# Patient Record
Sex: FEMALE | Race: AMERICAN INDIAN OR ALASKA NATIVE
[De-identification: names, ages, dates, MRNs, and addresses within clinical notes are randomized per-mention and may not be internally consistent; named-entity substitution may affect disease eponyms.]

---

## 2021-07-27 ENCOUNTER — HOSPITAL ENCOUNTER (OUTPATIENT)
Dept: HOSPITAL 5 - ED | Age: 21
Setting detail: OBSERVATION
LOS: 1 days | Discharge: HOME | End: 2021-07-28
Attending: OBSTETRICS & GYNECOLOGY | Admitting: OBSTETRICS & GYNECOLOGY
Payer: COMMERCIAL

## 2021-07-27 DIAGNOSIS — D64.9: Primary | ICD-10-CM

## 2021-07-27 DIAGNOSIS — Z98.890: ICD-10-CM

## 2021-07-27 DIAGNOSIS — I47.1: ICD-10-CM

## 2021-07-27 DIAGNOSIS — O07.4: ICD-10-CM

## 2021-07-27 DIAGNOSIS — N93.8: ICD-10-CM

## 2021-07-27 DIAGNOSIS — Z79.899: ICD-10-CM

## 2021-07-27 DIAGNOSIS — E78.00: ICD-10-CM

## 2021-07-27 DIAGNOSIS — G43.909: ICD-10-CM

## 2021-07-27 LAB
ALBUMIN SERPL-MCNC: 4.1 G/DL (ref 3.9–5)
ALT SERPL-CCNC: 22 UNITS/L (ref 7–56)
BASOPHILS # (AUTO): 0.1 K/MM3 (ref 0–0.1)
BASOPHILS NFR BLD AUTO: 1.5 % (ref 0–1.8)
BUN SERPL-MCNC: 12 MG/DL (ref 7–17)
BUN/CREAT SERPL: 24 %
CALCIUM SERPL-MCNC: 9.2 MG/DL (ref 8.4–10.2)
EOSINOPHIL # BLD AUTO: 0.1 K/MM3 (ref 0–0.4)
EOSINOPHIL NFR BLD AUTO: 1.7 % (ref 0–4.3)
HCT VFR BLD CALC: 17.6 % (ref 30.3–42.9)
HEMOLYSIS INDEX: 2
HGB BLD-MCNC: 5.7 GM/DL (ref 10.1–14.3)
LYMPHOCYTES # BLD AUTO: 1 K/MM3 (ref 1.2–5.4)
LYMPHOCYTES NFR BLD AUTO: 13.4 % (ref 13.4–35)
MCHC RBC AUTO-ENTMCNC: 32 % (ref 30–34)
MCV RBC AUTO: 80 FL (ref 79–97)
MONOCYTES # (AUTO): 0.5 K/MM3 (ref 0–0.8)
MONOCYTES % (AUTO): 7.6 % (ref 0–7.3)
PLATELET # BLD: 268 K/MM3 (ref 140–440)
RBC # BLD AUTO: 2.2 M/MM3 (ref 3.65–5.03)

## 2021-07-27 PROCEDURE — 96374 THER/PROPH/DIAG INJ IV PUSH: CPT

## 2021-07-27 PROCEDURE — 86850 RBC ANTIBODY SCREEN: CPT

## 2021-07-27 PROCEDURE — 85014 HEMATOCRIT: CPT

## 2021-07-27 PROCEDURE — 85025 COMPLETE CBC W/AUTO DIFF WBC: CPT

## 2021-07-27 PROCEDURE — 36415 COLL VENOUS BLD VENIPUNCTURE: CPT

## 2021-07-27 PROCEDURE — 86900 BLOOD TYPING SEROLOGIC ABO: CPT

## 2021-07-27 PROCEDURE — 36430 TRANSFUSION BLD/BLD COMPNT: CPT

## 2021-07-27 PROCEDURE — 86901 BLOOD TYPING SEROLOGIC RH(D): CPT

## 2021-07-27 PROCEDURE — 76856 US EXAM PELVIC COMPLETE: CPT

## 2021-07-27 PROCEDURE — 76830 TRANSVAGINAL US NON-OB: CPT

## 2021-07-27 PROCEDURE — G0378 HOSPITAL OBSERVATION PER HR: HCPCS

## 2021-07-27 PROCEDURE — 85018 HEMOGLOBIN: CPT

## 2021-07-27 PROCEDURE — P9016 RBC LEUKOCYTES REDUCED: HCPCS

## 2021-07-27 PROCEDURE — 80053 COMPREHEN METABOLIC PANEL: CPT

## 2021-07-27 PROCEDURE — 86920 COMPATIBILITY TEST SPIN: CPT

## 2021-07-27 PROCEDURE — 84702 CHORIONIC GONADOTROPIN TEST: CPT

## 2021-07-27 PROCEDURE — 99291 CRITICAL CARE FIRST HOUR: CPT

## 2021-07-27 NOTE — HISTORY AND PHYSICAL REPORT
History of Present Illness


Date of examination: 21


Date of admission: 


21 18:14





Chief complaint: 





anemia and vaginal bleeding.


History of present illness: 





Patient is a 20-year-old Afro-American female  1 para 0-0-1-0. She had a 

medically induced  several weeks ago. Her ultrasound shows tissue inside

endometrial cavity and the patient is bleeding and has anemia. She is scheduled 

for D&C on Friday this week. She was admitted for observation with transfusion 

of 2 units of packed cells today.





Past History


Past Medical History: no pertinent history


Past Surgical History: no surgical history


Family/Genetic History: none


Social history: single





Medications and Allergies


                                    Allergies











Allergy/AdvReac Type Severity Reaction Status Date / Time


 


No Known Allergies Allergy   Verified 21 15:00











                                Home Medications











 Medication  Instructions  Recorded  Confirmed  Last Taken  Type


 


Ferrous Sulfate [Feosol] 325 mg PO QDAY 21 Unknown History











Active Meds: 


Active Medications





Hydrocodone Bitart/Acetaminophen (Hydrocodone/Acetaminophen 5-325 Mg Tab)  1 

each PO Q6H PRN


   PRN Reason: Pain, Moderate (4-6)


Lactated Ringer's (Lactated Ringers)  1,000 mls @ 125 mls/hr IV AS DIRECT ERIC











Review of Systems


All systems: negative





- Vital Signs


Vital signs: 


                                   Vital Signs











Temp Pulse Resp BP Pulse Ox


 


 98.3 F   115 H  20   123/50   100 


 


 21 11:53  21 11:53  21 11:53  21 11:53  21 11:53








                                        











Temp Pulse Resp BP Pulse Ox


 


 98.8 F   97 H  20   128/73   100 


 


 21 20:40  21 20:40  21 20:40  21 20:40  21 20:40














- Physical Exam


Breasts: 


Cardiovascular: Regular rate, Normal S1, Normal S2


Abdomen: Positive: normal appearance, soft, normal bowel sounds.  Negative: 

distention, tenderness


Genitourinary (Female): Positive: normal external genitalia


Vulva: both: normal


Vagina: Positive: normal moisture.  Negative: discharge


Cervix: Positive: other (Blood in cervix cervix slightly dilated).  Negative: 

lesion, discharge


Uterus: Positive: normal size, normal contour


Adnexa: both: normal


Anus/Rectum: Positive: normal perianal skin, heme negative.  Negative: rectal 

mass, hemorrhoids


Extremities: Positive: normal


Deep Tendon Reflex Grade: Normal +2





Results


Result Diagrams: 


                                 21 12:17





                                 21 12:17


                              Abnormal lab results











  21 Range/Units





  12:17 12:17 12:17 


 


RBC  2.20 L    (3.65-5.03)  M/mm3


 


Hgb  5.7 L*    (10.1-14.3)  gm/dl


 


Hct  17.6 L*    (30.3-42.9)  %


 


MCH  26 L    (28-32)  pg


 


RDW  19.8 H    (13.2-15.2)  %


 


Mono % (Auto)  7.6 H    (0.0-7.3)  %


 


Lymph # (Auto)  1.0 L    (1.2-5.4)  K/mm3


 


Seg Neutrophils %  75.8 H    (40.0-70.0)  %


 


Carbon Dioxide   21 L   (22-30)  mmol/L


 


Creatinine   0.5 L   (0.6-1.2)  mg/dL


 


HCG, Quant    67.32 H  (0-4)  mIU/mL


 


Crossmatch     














  21 Range/Units





  12:20 


 


RBC   (3.65-5.03)  M/mm3


 


Hgb   (10.1-14.3)  gm/dl


 


Hct   (30.3-42.9)  %


 


MCH   (28-32)  pg


 


RDW   (13.2-15.2)  %


 


Mono % (Auto)   (0.0-7.3)  %


 


Lymph # (Auto)   (1.2-5.4)  K/mm3


 


Seg Neutrophils %   (40.0-70.0)  %


 


Carbon Dioxide   (22-30)  mmol/L


 


Creatinine   (0.6-1.2)  mg/dL


 


HCG, Quant   (0-4)  mIU/mL


 


Crossmatch  See Detail  








All other labs normal.








Assessment and Plan





- Patient Problems


(1) Anemia requiring transfusions


Current Visit: Yes   Status: Acute   





(2) DUB (dysfunctional uterine bleeding)


Current Visit: Yes   Status: Acute   





(3) Retained products of conception


Current Visit: Yes   Status: Acute   


Plan to address problem: 


Transfusion with 2 units of packed cells on today. The patient is to have a D&C 

suction curettage in 3 days this hospital.

## 2021-07-27 NOTE — ULTRASOUND REPORT
US pelvic complete



INDICATION / CLINICAL INFORMATION: 

vaginal bleeding.



TECHNIQUE:

Transabdominal and Transvaginal.

Duplex Color Doppler used: Yes. 



COMPARISON: 

None available



FINDINGS:

UTERUS:  Measures 11.8 cm. 

-Thickened endometrial stripe measures 1.6 cm with increased hyperemia.

- Mass lesions: None.





RIGHT ADNEXA:  No significant ovarian cyst or mass. Normal color Doppler blood flow.



LEFT ADNEXA: No significant ovarian cyst or mass. Normal color Doppler blood flow.



URINARY BLADDER: No significant abnormality. 

FREE FLUID: None.

ADDITIONAL FINDINGS: None.



IMPRESSION:

1. Findings consistent with retained products of conception.



Signer Name: Ray Juarez MD 

Signed: 7/27/2021 5:50 PM

Workstation Name: [id number]

## 2021-07-27 NOTE — EVENT NOTE
ED Screening Note


ED Screening Note: 





Patient states 2 months ago she had a medical pill 


States since then she has had heavy vaginal bleeding


She states that she went to [facility] and was advised she had a hemoglobin of 5 and 

states that she received a blood transfusion


States that she is scheduled to have a D&C by [facility] on 21


Reports to the ER due to pain, weakness, fatigue, and concerns that her blood 

counts are low


She states that her bleeding has improved








This initial assessment/diagnostic orders/clinical plan/treatment(s) is/are 

subject to change based on patients health status, clinical progression and re-

assessment by fellow clinical providers in the ED. Further treatment and workup 

at subsequent clinical providers discretion. Patient/guardian urged not to elope

from the ED as their condition may be serious if not clinically assessed and 

managed. 





Initial orders include: 


Labs

## 2021-07-27 NOTE — EMERGENCY DEPARTMENT REPORT
ED General Adult HPI





- General


Chief complaint: Vaginal Bleeding


Stated complaint: DIFF BREATHING


Time Seen by Provider: 07/27/21 11:56


Source: patient, family


Mode of arrival: Wheelchair


Limitations: No Limitations





- History of Present Illness


Initial comments: 





Patient presents to the emergency department with a chief complaint of vaginal 

bleeding that has been present for the last 2 months.  Patient states that 2 

months ago she took a pill to terminate pregnancy and since that time has not 

had cessation of vaginal bleeding.  Patient states that on 6/9/2021 she was seen

at [facility] at the main campus and was transfused 2 units of blood

and told to follow-up with gynecology.  Patient states she is scheduled for a 

D&C with [facility] obstetrics and gynecology on Friday of this week.  Patient 

complains of mild abdominal cramping.  Patient states that she is having 

exertional shortness of breath especially when walking.  Denies chest pain.


-: Sudden, unknown (2 months ago)


Location: abdomen


Radiation: non-radiation


Severity scale (0 -10): 2 (Cramping in nature)


Quality: other (Cramping in nature)


Consistency: constant


Improves with: none


Worsens with: none


Associated Symptoms: denies other symptoms


Treatments Prior to Arrival: none





- Related Data


                                Home Medications











 Medication  Instructions  Recorded  Confirmed  Last Taken


 


Ferrous Sulfate [Feosol] 325 mg PO QDAY 07/26/21 07/26/21 Unknown











                                    Allergies











Allergy/AdvReac Type Severity Reaction Status Date / Time


 


No Known Allergies Allergy   Verified 07/26/21 15:00














ED Review of Systems


ROS: 


Stated complaint: DIFF BREATHING


Other details as noted in HPI





Comment: All other systems reviewed and negative


Constitutional: denies: chills, fever


Eyes: denies: eye pain, eye discharge, vision change


ENT: denies: ear pain, throat pain


Respiratory: denies: cough, shortness of breath, wheezing


Cardiovascular: denies: chest pain, palpitations


Endocrine: no symptoms reported


Gastrointestinal: denies: abdominal pain, nausea, diarrhea


Genitourinary: other (Vaginal bleeding).  denies: urgency, dysuria, discharge


Musculoskeletal: denies: back pain, joint swelling, arthralgia


Skin: denies: rash, lesions


Neurological: denies: headache, weakness, paresthesias


Psychiatric: denies: anxiety, depression


Hematological/Lymphatic: denies: easy bleeding, easy bruising





ED Past Medical Hx





- Past Medical History


Previous Medical History?: Yes


Hx Hypertension: No


Hx Headaches / Migraines: Yes (MIGRAINES)


Additional medical history: SVT.  high cholestrol.  anemia





- Surgical History


Additional Surgical History: ablation 12/2014





- Social History


Smoking Status: Never Smoker





- Medications


Home Medications: 


                                Home Medications











 Medication  Instructions  Recorded  Confirmed  Last Taken  Type


 


Ferrous Sulfate [Feosol] 325 mg PO QDAY 07/26/21 07/26/21 Unknown History














ED Physical Exam





- General


Limitations: No Limitations


General appearance: alert, in no apparent distress





- Head


Head exam: Present: atraumatic, normocephalic





- Eye


Eye exam: Present: normal appearance, PERRL, EOMI





- ENT


ENT exam: Present: mucous membranes moist





- Neck


Neck exam: Present: normal inspection





- Respiratory


Respiratory exam: Present: normal lung sounds bilaterally.  Absent: respiratory 

distress





- Cardiovascular


Cardiovascular Exam: Present: normal rhythm, tachycardia.  Absent: systolic 

murmur, diastolic murmur, rubs, gallop





- GI/Abdominal


GI/Abdominal exam: Present: soft, normal bowel sounds.  Absent: distended, 

tenderness





- 


External exam: Present: other (Deferred)


Speculum exam: Present: other (Deferred)


Bi-manual exam: Present: other (Deferred)





- Extremities Exam


Extremities exam: Present: normal inspection





- Back Exam


Back exam: Present: normal inspection





- Neurological Exam


Neurological exam: Present: alert, oriented X3, CN II-XII intact.  Absent: motor

 sensory deficit





- Psychiatric


Psychiatric exam: Present: normal affect, normal mood, anxious





- Skin


Skin exam: Present: warm, dry, intact, normal color.  Absent: rash





ED Course


                                   Vital Signs











  07/27/21





  15:36


 


O2 Sat by Pulse 98





Oximetry 














ED Medical Decision Making





- Lab Data


Result diagrams: 


                                 07/27/21 12:17





                                 07/27/21 12:17








                                   Lab Results











  07/27/21 07/27/21 07/27/21 Range/Units





  12:17 12:17 12:17 


 


WBC  7.2    (4.5-11.0)  K/mm3


 


RBC  2.20 L    (3.65-5.03)  M/mm3


 


Hgb  5.7 L*    (10.1-14.3)  gm/dl


 


Hct  17.6 L*    (30.3-42.9)  %


 


MCV  80    (79-97)  fl


 


MCH  26 L    (28-32)  pg


 


MCHC  32    (30-34)  %


 


RDW  19.8 H    (13.2-15.2)  %


 


Plt Count  268    (140-440)  K/mm3


 


Lymph % (Auto)  13.4    (13.4-35.0)  %


 


Mono % (Auto)  7.6 H    (0.0-7.3)  %


 


Eos % (Auto)  1.7    (0.0-4.3)  %


 


Baso % (Auto)  1.5    (0.0-1.8)  %


 


Lymph # (Auto)  1.0 L    (1.2-5.4)  K/mm3


 


Mono # (Auto)  0.5    (0.0-0.8)  K/mm3


 


Eos # (Auto)  0.1    (0.0-0.4)  K/mm3


 


Baso # (Auto)  0.1    (0.0-0.1)  K/mm3


 


Seg Neutrophils %  75.8 H    (40.0-70.0)  %


 


Seg Neutrophils #  5.5    (1.8-7.7)  K/mm3


 


Sodium   137   (137-145)  mmol/L


 


Potassium   3.7   (3.6-5.0)  mmol/L


 


Chloride   103.1   ()  mmol/L


 


Carbon Dioxide   21 L   (22-30)  mmol/L


 


Anion Gap   17   mmol/L


 


BUN   12   (7-17)  mg/dL


 


Creatinine   0.5 L   (0.6-1.2)  mg/dL


 


Estimated GFR   > 60   ml/min


 


BUN/Creatinine Ratio   24   %


 


Glucose   89   ()  mg/dL


 


Calcium   9.2   (8.4-10.2)  mg/dL


 


Total Bilirubin   0.30   (0.1-1.2)  mg/dL


 


AST   24   (5-40)  units/L


 


ALT   22   (7-56)  units/L


 


Alkaline Phosphatase   72   ()  units/L


 


Total Protein   6.7   (6.3-8.2)  g/dL


 


Albumin   4.1   (3.9-5)  g/dL


 


Albumin/Globulin Ratio   1.6   %


 


HCG, Quant    67.32 H  (0-4)  mIU/mL


 


Blood Type     


 


Antibody Screen     


 


Crossmatch     














  07/27/21 Range/Units





  12:20 


 


WBC   (4.5-11.0)  K/mm3


 


RBC   (3.65-5.03)  M/mm3


 


Hgb   (10.1-14.3)  gm/dl


 


Hct   (30.3-42.9)  %


 


MCV   (79-97)  fl


 


MCH   (28-32)  pg


 


MCHC   (30-34)  %


 


RDW   (13.2-15.2)  %


 


Plt Count   (140-440)  K/mm3


 


Lymph % (Auto)   (13.4-35.0)  %


 


Mono % (Auto)   (0.0-7.3)  %


 


Eos % (Auto)   (0.0-4.3)  %


 


Baso % (Auto)   (0.0-1.8)  %


 


Lymph # (Auto)   (1.2-5.4)  K/mm3


 


Mono # (Auto)   (0.0-0.8)  K/mm3


 


Eos # (Auto)   (0.0-0.4)  K/mm3


 


Baso # (Auto)   (0.0-0.1)  K/mm3


 


Seg Neutrophils %   (40.0-70.0)  %


 


Seg Neutrophils #   (1.8-7.7)  K/mm3


 


Sodium   (137-145)  mmol/L


 


Potassium   (3.6-5.0)  mmol/L


 


Chloride   ()  mmol/L


 


Carbon Dioxide   (22-30)  mmol/L


 


Anion Gap   mmol/L


 


BUN   (7-17)  mg/dL


 


Creatinine   (0.6-1.2)  mg/dL


 


Estimated GFR   ml/min


 


BUN/Creatinine Ratio   %


 


Glucose   ()  mg/dL


 


Calcium   (8.4-10.2)  mg/dL


 


Total Bilirubin   (0.1-1.2)  mg/dL


 


AST   (5-40)  units/L


 


ALT   (7-56)  units/L


 


Alkaline Phosphatase   ()  units/L


 


Total Protein   (6.3-8.2)  g/dL


 


Albumin   (3.9-5)  g/dL


 


Albumin/Globulin Ratio   %


 


HCG, Quant   (0-4)  mIU/mL


 


Blood Type  O POSITIVE  


 


Antibody Screen  Negative  


 


Crossmatch  See Detail  














- Radiology Data


Radiology results: report reviewed





- Medical Decision Making





Blood transfusion initiated in the ED


Discussed plan of care and results with the patient


Spoke with Dr. Craven who will admit the patient to mother-baby 


Critical Care Time: Yes


Critical care time in (mins) excluding proc time.: 35


Critical care attestation.: 


If time is entered above; I have spent that time in minutes in the direct care 

of this critically ill patient, excluding procedure time.








ED Disposition


Clinical Impression: 


 Anemia requiring transfusions, DUB (dysfunctional uterine bleeding), Retained 

products of conception





Disposition: DC-09 OP ADMIT IP TO THIS HOSP


Is pt being admited?: Yes


Does the pt Need Aspirin: No


Condition: Fair


Referrals: 


PRIMARY CARE,MD [Primary Care Provider] - 3-5 Days

## 2021-07-28 VITALS — DIASTOLIC BLOOD PRESSURE: 76 MMHG | SYSTOLIC BLOOD PRESSURE: 131 MMHG

## 2021-07-28 LAB
HCT VFR BLD CALC: 23.1 % (ref 30.3–42.9)
HGB BLD-MCNC: 7.7 GM/DL (ref 10.1–14.3)

## 2021-07-28 NOTE — DISCHARGE SUMMARY
Providers





- Providers


Date of Admission: 


21 18:14





Date of discharge: 21


Attending physician: 


SUNSHINE KITCHEN MD





Primary care physician: 


PRIMARY CARE MD








Hospitalization


Reason for admission: vaginal bleeding, other (symptomatic anemia)


Hospital course: 





20-year-old  complicated by history of anemia status post transfusion x1

[place] [facility] history of failed medical  with planned D&C this 

2021 presenting with symptomatic anemia admitted for 2 units of 

blood and observation.  Patient reports that she is much better status post 

transfusion and is agreeable for discharge with plan for surgical management of 

retained products of conception this 2021.


Condition at discharge: Fair


Disposition: DC-01 TO HOME OR SELFCARE





Plan





- Provider Discharge Summary


Activity: routine


Diet: routine


Instructions: routine


Additional instructions: 


[]  Smoking cessation referral if applicable(refer to patient education folder 

for contact #)


[]  Refer to [facility] Booklet








Call your doctor immediately for:


* Fever > 100.5


* Heavy vaginal bleeding ( >1 pad per hour)


* Severe persistent headache


* Shortness of breath


* Reddened, hot, painful area to leg or breast


* Drainage or odor from incision.





* Keep incision clean and dry at all times and follow doctor's instructions 

regarding bathing/showering











- Follow up plan


Follow up: 


PRIMARY CARE,MD [Primary Care Provider] - 3-5 Days


Forms:  [facility] Discharge Summary

## 2021-07-28 NOTE — ULTRASOUND REPORT
ULTRASOUND TRANSVAGINAL



HISTORY: Vaginal bleeding



TECHNIQUE: Transvaginal ultrasound with color Doppler imaging.



COMPARISON: Ultrasound pelvic transabdominal performed at the same time.



FINDINGS:

This examination is just presented to me for interpretation due to technical factors at the hospital.
 The transabdominal component of this exam was dictated yesterday by another radiologist.



Transvaginal images demonstrate a retroflexed uterus measuring 11.8 x 3.7 x 5.6 cm. No obvious uterin
e fibroid disease is appreciated.



The endometrial stripe is heterogeneous with cystic and solid components and thickened up to 1.6 cm a
nd demonstrates hyperemia on color Doppler interrogation.



The right ovary is unremarkable measuring 3.7 x 2.6 x 4.1 cm. The left ovary is unremarkable measurin
g 3.0 x 2.2 x 3.4 cm.



No pelvic fluid collection, cyst or mass is appreciated.



IMPRESSION:

Abnormal endometrium as described above concerning for retained products of conception. Please correl
ate with the patient's clinical history.



Signer Name: Rogelio Maria Jr, MD 

Signed: 7/28/2021 8:45 AM

Workstation Name: [id number]

## 2021-07-30 ENCOUNTER — HOSPITAL ENCOUNTER (OUTPATIENT)
Dept: HOSPITAL 5 - OR | Age: 21
Discharge: HOME | End: 2021-07-30
Attending: OBSTETRICS & GYNECOLOGY
Payer: MEDICAID

## 2021-07-30 VITALS — SYSTOLIC BLOOD PRESSURE: 117 MMHG | DIASTOLIC BLOOD PRESSURE: 78 MMHG

## 2021-07-30 DIAGNOSIS — Z98.890: ICD-10-CM

## 2021-07-30 DIAGNOSIS — Z79.899: ICD-10-CM

## 2021-07-30 DIAGNOSIS — Z82.49: ICD-10-CM

## 2021-07-30 DIAGNOSIS — O03.4: Primary | ICD-10-CM

## 2021-07-30 DIAGNOSIS — D64.9: ICD-10-CM

## 2021-07-30 LAB
HCT VFR BLD CALC: 31 % (ref 30.3–42.9)
HGB BLD-MCNC: 9.9 GM/DL (ref 10.1–14.3)
MCHC RBC AUTO-ENTMCNC: 32 % (ref 30–34)
MCV RBC AUTO: 84 FL (ref 79–97)
PLATELET # BLD: 275 K/MM3 (ref 140–440)
RBC # BLD AUTO: 3.68 M/MM3 (ref 3.65–5.03)

## 2021-07-30 PROCEDURE — 36415 COLL VENOUS BLD VENIPUNCTURE: CPT

## 2021-07-30 PROCEDURE — 85027 COMPLETE CBC AUTOMATED: CPT

## 2021-07-30 PROCEDURE — 59812 TREATMENT OF MISCARRIAGE: CPT

## 2021-07-30 PROCEDURE — 88305 TISSUE EXAM BY PATHOLOGIST: CPT

## 2021-07-30 RX ADMIN — HYDROMORPHONE HYDROCHLORIDE PRN MG: 1 INJECTION, SOLUTION INTRAMUSCULAR; INTRAVENOUS; SUBCUTANEOUS at 11:50

## 2021-07-30 RX ADMIN — HYDROMORPHONE HYDROCHLORIDE PRN MG: 1 INJECTION, SOLUTION INTRAMUSCULAR; INTRAVENOUS; SUBCUTANEOUS at 11:40

## 2021-07-30 NOTE — ANESTHESIA CONSULTATION
Anesthesia Consult and Med Hx


Date of service: 07/30/21





- Airway


Anesthetic Teeth Evaluation: Good (Has tooth gems cemented on)


ROM Head & Neck: Adequate


Mental/Hyoid Distance: Adequate


Mallampati Class: Class I


Intubation Access Assessment: Good





- Pre-Operative Health Status


ASA Pre-Surgery Classification: ASA3


Proposed Anesthetic Plan: General





- Pulmonary


Hx Smoking: Yes (smoking [place])


Hx Asthma: No


SOB: No


COPD: No


Hx Pneumonia: No


Hx Sleep Apnea: No





- Cardiovascular System


Hx Hypertension: No


Hx Coronary Artery Disease: No


Hx Heart Attack/AMI: No


Hx Angina: No


Hx Percutaneous Transluminal Coronary Angioplasty (PTCA): No


Hx Cardia Arrhythmia: Yes (Hx SVT-had ablation. Reports palpitations)


Hx Pacemaker: No


Hx Internal Defibrillator: No


Hx Valvular Heart Disease: No


Hx Heart Murmur: Yes


Hx Peripheral Vascular Disease: No





- Central Nervous System


CVA: Yes (? Was seen in our ER for CVA)


Hx Back Pain: No


Hx Psychiatric Problems: No





- Gastrointestinal


Hx Ulcer: No





- Endocrine


Hx Renal Disease: No


Hx End Stage Renal Disease: No


Hx Cirrhosis: No


Hx Liver Disease: No


Hx Hypothyroidism: No


Hx Hyperthyroidism: No





- Hematic


Hx Anemia: Yes (Was 5.7/17.6 and tx'd blood to 7.7/23.1 [id number])


Hx Sickle Cell Disease: No





- Other Systems


Hx Alcohol Use: Yes (occassional)


Hx Substance Use: Yes (MJ)


Hx Cancer: No


Hx Obesity: No

## 2021-07-30 NOTE — OPERATIVE REPORT
Operative Report


Operative Report: 





Preoperative diagnosis: Incomplete 


Postoperative diagnosis: Same


Procedure: Suction dilation and curettage


Surgeon: Dr. Danielle Nam


Assist: None


Anesthesia: GETA


Complications: None


IV fluids: 1 L crystalloid


EBL: Minimal


Urine output: 100 cc clear urine


Drains: None


Specimen: Products of conception sent to pathology





Findings: 6-week size uterus with no palpable adnexal masses


Procedure: Patient was counseled in preop holding area about risks benefits 

possible complications as well as alternatives to the procedure. Form consent 

was obtained. She was taken to the operating room where she received excellent 

general endotracheal anesthesia. She was then placed in the dorsal lithotomy p

osition, prepped and draped in a sterile fashion. A timeout was verified. Using 

a red rubber the catheter 100 cc of clear urine was obtained. Speculum was then 

placed in the vaginal vault, the cervix was identified and grasped on the 

anterior lip with a single-tooth tenaculum. The cervix was dilated to allow for 

passage of a #8 curved suction curette. Products of conception were then 

evacuated using suction, the uterus confirmed empty using a Kevorkian curette at

the completion of the procedure. Minimal vaginal bleeding during the procedure. 

The tenaculum and speculum were removed from the anterior lip of the cervix and 

the vagina respectively at the completion of the procedure. The uterus is noted 

to be firm at the completion of the procedure. All sponge needle and instrument 

counts were correct x2. There were no complications. Patient was extubated and 

taken to the recovery area in stable condition. Patient's family notified of her

stable condition at the completion of the procedure. She will follow-up in the 

outpatient setting in 1 week.


GEOVANNA Nam MD

## 2022-06-24 ENCOUNTER — HOSPITAL ENCOUNTER (OUTPATIENT)
Dept: HOSPITAL 5 - TRG | Age: 22
Discharge: HOME | End: 2022-06-24
Attending: OBSTETRICS & GYNECOLOGY
Payer: COMMERCIAL

## 2022-06-24 VITALS — SYSTOLIC BLOOD PRESSURE: 112 MMHG | DIASTOLIC BLOOD PRESSURE: 63 MMHG

## 2022-06-24 DIAGNOSIS — Z3A.31: ICD-10-CM

## 2022-06-24 DIAGNOSIS — G43.909: ICD-10-CM

## 2022-06-24 DIAGNOSIS — O26.893: ICD-10-CM

## 2022-06-24 DIAGNOSIS — O99.353: ICD-10-CM

## 2022-06-24 DIAGNOSIS — R10.30: ICD-10-CM

## 2022-06-24 LAB
MUCOUS THREADS #/AREA URNS HPF: (no result) /HPF
PH UR STRIP: 8 [PH] (ref 5–7)
PROT UR STRIP-MCNC: (no result) MG/DL
RBC #/AREA URNS HPF: 1 /HPF (ref 0–6)
UROBILINOGEN UR-MCNC: < 2 MG/DL (ref ?–2)
WBC #/AREA URNS HPF: 1 /HPF (ref 0–6)

## 2022-06-24 PROCEDURE — 76815 OB US LIMITED FETUS(S): CPT

## 2022-06-24 PROCEDURE — 76819 FETAL BIOPHYS PROFIL W/O NST: CPT

## 2022-06-24 PROCEDURE — 81001 URINALYSIS AUTO W/SCOPE: CPT

## 2022-06-24 PROCEDURE — 96372 THER/PROPH/DIAG INJ SC/IM: CPT

## 2022-06-24 PROCEDURE — 82731 ASSAY OF FETAL FIBRONECTIN: CPT

## 2022-06-24 PROCEDURE — 59025 FETAL NON-STRESS TEST: CPT

## 2022-06-24 PROCEDURE — 84112 EVAL AMNIOTIC FLUID PROTEIN: CPT

## 2022-06-24 PROCEDURE — 36415 COLL VENOUS BLD VENIPUNCTURE: CPT

## 2022-06-24 NOTE — ULTRASOUND REPORT
ULTRASOUND OBSTETRIC LIMITED 

ULTRASOUND FETAL BIOPHYSICAL PROFILE



INDICATION / CLINICAL INFORMATION: Leaking Fluid, Fetal Well Being, BPP   BONITA.

- Clinical Gestational Age (GA) in weeks, days: 31, 3 



TECHNIQUE: Transabdominal.



COMPARISON: None available.



FINDINGS:



FETAL BREATHING MOVEMENT = 2

GROSS BODY MOVEMENT = 2

FETAL TONE = 2

QUALITATIVE AMNIOTIC FLUID VOLUME = 2



TOTAL BIOPHYSICAL SCORE = 8/8



FETAL HEART RATE (beats per minute): 125-133 



AMNIOTIC FLUID INDEX (cm) =  12.7   (normal = 7-24 cm)

PRESENTATION: Cephalic. 



ADDITIONAL FINDINGS: None.



IMPRESSION:

1. Fetal Biophysical Score = 8/8 

2. BONITA 12.7 cm.



Signer Name: Morro Mckeon MD 

Signed: 6/24/2022 1:03 PM

Workstation Name: [id number]

## 2022-06-24 NOTE — ULTRASOUND REPORT
ULTRASOUND OBSTETRIC LIMITED 

ULTRASOUND FETAL BIOPHYSICAL PROFILE



INDICATION / CLINICAL INFORMATION: Leaking Fluid, Fetal Well Being, BPP   BONITA.

- Clinical Gestational Age (GA) in weeks, days: 31, 3 



TECHNIQUE: Transabdominal.



COMPARISON: None available.



FINDINGS:



FETAL BREATHING MOVEMENT = 2

GROSS BODY MOVEMENT = 2

FETAL TONE = 2

QUALITATIVE AMNIOTIC FLUID VOLUME = 2



TOTAL BIOPHYSICAL SCORE = 8/8



FETAL HEART RATE (beats per minute): 125-133 



AMNIOTIC FLUID INDEX (cm) =  12.7   (normal = 7-24 cm)

PRESENTATION: Cephalic. 



ADDITIONAL FINDINGS: None.



IMPRESSION:

1. Fetal Biophysical Score = 8/8 

2. BONITA 12.7 cm.



Signer Name: Morro Mckeon MD 

Signed: 6/24/2022 1:03 PM

Workstation Name: [email or web address]

## 2022-06-25 ENCOUNTER — HOSPITAL ENCOUNTER (OUTPATIENT)
Dept: HOSPITAL 5 - LD | Age: 22
Discharge: HOME | End: 2022-06-25
Attending: OBSTETRICS & GYNECOLOGY
Payer: COMMERCIAL

## 2022-06-25 DIAGNOSIS — Z34.93: Primary | ICD-10-CM

## 2022-06-25 DIAGNOSIS — Z3A.31: ICD-10-CM

## 2022-06-25 PROCEDURE — 96372 THER/PROPH/DIAG INJ SC/IM: CPT

## 2022-07-31 ENCOUNTER — HOSPITAL ENCOUNTER (OUTPATIENT)
Dept: HOSPITAL 5 - TRG | Age: 22
Discharge: HOME | End: 2022-07-31
Attending: OBSTETRICS & GYNECOLOGY
Payer: MEDICAID

## 2022-07-31 VITALS — DIASTOLIC BLOOD PRESSURE: 70 MMHG | SYSTOLIC BLOOD PRESSURE: 112 MMHG

## 2022-07-31 DIAGNOSIS — Z3A.36: ICD-10-CM

## 2022-07-31 DIAGNOSIS — O42.90: ICD-10-CM

## 2022-07-31 DIAGNOSIS — O47.03: Primary | ICD-10-CM

## 2022-07-31 PROCEDURE — 36415 COLL VENOUS BLD VENIPUNCTURE: CPT

## 2022-07-31 PROCEDURE — 76815 OB US LIMITED FETUS(S): CPT

## 2022-07-31 PROCEDURE — 84112 EVAL AMNIOTIC FLUID PROTEIN: CPT

## 2022-07-31 PROCEDURE — 76819 FETAL BIOPHYS PROFIL W/O NST: CPT

## 2022-07-31 NOTE — ULTRASOUND REPORT
ULTRASOUND OBSTETRIC LIMITED 

ULTRASOUND FETAL BIOPHYSICAL PROFILE



INDICATION / CLINICAL INFORMATION:

Evaluate fetal well-being.



COMPARISON:

OB ultrasound performed on 6/24/2022.



FINDINGS:



FETAL BREATHING MOVEMENT = 2

GROSS BODY MOVEMENT = 2

FETAL TONE = 2

QUALITATIVE AMNIOTIC FLUID VOLUME = 2



TOTAL BIOPHYSICAL SCORE = 8/8



AMNIOTIC FLUID INDEX (cm) =  13.0

PRESENTATION: Cephalic. 

FETAL HEART RATE (beats per minute): 133 



ADDITIONAL FINDINGS: None.



IMPRESSION:

1. Fetal Biophysical Score = 8/8

2. No significant abnormality.



Signer Name: Aryan Rodrigues MD 

Signed: 7/31/2022 11:13 PM

Workstation Name: [id number]

## 2022-07-31 NOTE — ULTRASOUND REPORT
ULTRASOUND OBSTETRIC LIMITED 

ULTRASOUND FETAL BIOPHYSICAL PROFILE



INDICATION / CLINICAL INFORMATION:

Evaluate fetal well-being.



COMPARISON:

OB ultrasound performed on 6/24/2022.



FINDINGS:



FETAL BREATHING MOVEMENT = 2

GROSS BODY MOVEMENT = 2

FETAL TONE = 2

QUALITATIVE AMNIOTIC FLUID VOLUME = 2



TOTAL BIOPHYSICAL SCORE = 8/8



AMNIOTIC FLUID INDEX (cm) =  13.0

PRESENTATION: Cephalic. 

FETAL HEART RATE (beats per minute): 133 



ADDITIONAL FINDINGS: None.



IMPRESSION:

1. Fetal Biophysical Score = 8/8

2. No significant abnormality.



Signer Name: Aryan Rodrigues MD 

Signed: 7/31/2022 11:13 PM

Workstation Name: [id number]

## 2022-08-20 ENCOUNTER — HOSPITAL ENCOUNTER (INPATIENT)
Dept: HOSPITAL 5 - TRG | Age: 22
LOS: 2 days | Discharge: HOME | End: 2022-08-22
Attending: OBSTETRICS & GYNECOLOGY | Admitting: OBSTETRICS & GYNECOLOGY
Payer: MEDICAID

## 2022-08-20 DIAGNOSIS — Z20.822: ICD-10-CM

## 2022-08-20 DIAGNOSIS — Z3A.39: ICD-10-CM

## 2022-08-20 LAB
HCT VFR BLD CALC: 35.4 % (ref 30.3–42.9)
HGB BLD-MCNC: 12.6 GM/DL (ref 10.1–14.3)
MCHC RBC AUTO-ENTMCNC: 36 % (ref 30–34)
MCV RBC AUTO: 94 FL (ref 79–97)
PLATELET # BLD: 184 K/MM3 (ref 140–440)
RBC # BLD AUTO: 3.75 M/MM3 (ref 3.65–5.03)

## 2022-08-20 PROCEDURE — 85027 COMPLETE CBC AUTOMATED: CPT

## 2022-08-20 PROCEDURE — U0003 INFECTIOUS AGENT DETECTION BY NUCLEIC ACID (DNA OR RNA); SEVERE ACUTE RESPIRATORY SYNDROME CORONAVIRUS 2 (SARS-COV-2) (CORONAVIRUS DISEASE [COVID-19]), AMPLIFIED PROBE TECHNIQUE, MAKING USE OF HIGH THROUGHPUT TECHNOLOGIES AS DESCRIBED BY CMS-2020-01-R: HCPCS

## 2022-08-20 PROCEDURE — 86850 RBC ANTIBODY SCREEN: CPT

## 2022-08-20 PROCEDURE — 86901 BLOOD TYPING SEROLOGIC RH(D): CPT

## 2022-08-20 PROCEDURE — 36415 COLL VENOUS BLD VENIPUNCTURE: CPT

## 2022-08-20 PROCEDURE — 85025 COMPLETE CBC W/AUTO DIFF WBC: CPT

## 2022-08-20 PROCEDURE — 86900 BLOOD TYPING SEROLOGIC ABO: CPT

## 2022-08-20 NOTE — HISTORY AND PHYSICAL REPORT
History of Present Illness


Date of examination: 22


Date of admission: 


22 18:09





Chief complaint: 





Here with C/O labor contractions


History of present illness: 


 22 y/o  AB1 presents to labor and delivery with C/O contractions. She had 

prenatal care at [facility]. She is GBS negative. She has a HX of SVT and 

a heart murmur and sees cardiology.








Past History


Past Medical History: other (HX of SVT- had ablation)


Past Surgical History: other (ablation)


Family/Genetic History: hypertension


Social history: no significant social history





- Obstetrical History


Expected Date of Delivery: 22


Actual Gestation: 39 Week(s) 4 Day(s) 


: 2


Para: 0


Induced : 1





Medications and Allergies


                                    Allergies











Allergy/AdvReac Type Severity Reaction Status Date / Time


 


No Known Allergies Allergy   Verified 22 11:26











                                Home Medications











 Medication  Instructions  Recorded  Confirmed  Last Taken  Type


 


Prenatal Vit No.179/Iron/Folic 1 each PO DAILY 22 09:00 

History





[Prenatal Tablet]     











Active Meds: 


Active Medications





Acetaminophen (Acetaminophen 325 Mg Tab)  650 mg PO Q4H PRN


   PRN Reason: Pain, Mild (1-3)


Butorphanol Tartrate (Butorphanol 2 Mg/1 Ml Inj)  1 mg IV Q2H PRN


   PRN Reason: Pain, Moderate(4-6) LABOR PAIN


Butorphanol Tartrate (Butorphanol 2 Mg/1 Ml Inj)  2 mg IV Q2H PRN


   PRN Reason: Pain , Severe (7-10)


Carboprost Tromethamine (Carboprost Tromethamine 250 Mcg/1 Ml Inj)  250 mcg IM 

ONCE PRN


   PRN Reason: Uterine Bleeding


Ephedrine Sulfate (Ephedrine Sulfate 50 Mg/1 Ml Inj)  10 mg IV Q2M PRN


   PRN Reason: Hypotension


Oxytocin/Sodium Chloride (Pitocin/Ns 30 Unit/500ml)  30 units in 500 mls @ 2 

mls/hr IV TITR ERIC; Protocol


Lactated Ringer's (Lactated Ringers)  1,000 mls @ 125 mls/hr IV AS DIRECT ERIC


Oxytocin/Sodium Chloride (Pitocin/Ns 30 Unit/500ml)  30 units in 500 mls @ 40 

mls/hr IV TITR ERIC; Protocol


Loperamide HCl (Loperamide 2 Mg Cap)  2 mg PO ONCE PRN


   PRN Reason: give with Hemabate


Methylergonovine Maleate (Methylergonovine Maleate 0.2 Mg/Ml Vial)  0.2 mg IM 

ONCE PRN


   PRN Reason: Uterine Bleeding


Mineral Oil (Mineral Oil 30 Ml Oral Liqd)  30 ml PO QHS PRN


   PRN Reason: Constipation


Misoprostol (Misoprostol 200 Mcg Tab)  800 mcg WI ONCE PRN


   PRN Reason: Uterine Bleeding


Oxytocin (Oxytocin 10 Unit/1 Ml Inj)  10 unit IM ONCE PRN


   PRN Reason: Uterine Bleeding


Terbutaline Sulfate (Terbutaline 1 Mg/1 Ml Inj)  0.25 mg SUB-Q ONCE PRN


   PRN Reason: Hyperstimulation/Hypertonicity











Review of Systems


All systems: negative





- Vital Signs


Vital signs: 


                                   Vital Signs











Pulse BP Pulse Ox


 


 94 H  137/88   99 


 


 22 17:14  22 17:14  22 17:14








                                        











Temp Pulse Resp BP Pulse Ox


 


    84      147/94   98 


 


    22 19:20     22 18:44  22 19:20














- Physical Exam


Breasts: Positive: deferred


Cardiovascular: Regular rate


Abdomen: Positive: soft


Genitourinary (Female): Positive: normal external genitalia


Vulva: both: normal


Vagina: Positive: normal moisture


Uterus: Positive: enlarged


Adnexa: both: normal


Deep Tendon Reflex Grade: Normal +2





- Obstetrical


FHR: category 1


Uterine Contraction Monitor Mode: Palpation


Cervical Dilatation: 3


Cervical Effacement Percentage: 70


Fetal station: -1


Uterine Contraction Frequency (min): every 4-6


Uterine Contraction Pattern: Irregular


Uterine Contraction Intensity: Moderate





Results


All other labs normal.








Assessment and Plan


A: Early labor @ 39.4 weeks


P: Admit and augment cytotec

## 2022-08-21 LAB
BASOPHILS # (AUTO): 0 K/MM3 (ref 0–0.1)
BASOPHILS NFR BLD AUTO: 0.2 % (ref 0–1.8)
EOSINOPHIL # BLD AUTO: 0.1 K/MM3 (ref 0–0.4)
EOSINOPHIL NFR BLD AUTO: 1 % (ref 0–4.3)
HCT VFR BLD CALC: 31.4 % (ref 30.3–42.9)
HGB BLD-MCNC: 10.9 GM/DL (ref 10.1–14.3)
LYMPHOCYTES # BLD AUTO: 1.7 K/MM3 (ref 1.2–5.4)
LYMPHOCYTES NFR BLD AUTO: 17.6 % (ref 13.4–35)
MCHC RBC AUTO-ENTMCNC: 35 % (ref 30–34)
MCV RBC AUTO: 95 FL (ref 79–97)
MONOCYTES # (AUTO): 0.8 K/MM3 (ref 0–0.8)
MONOCYTES % (AUTO): 7.7 % (ref 0–7.3)
PLATELET # BLD: 181 K/MM3 (ref 140–440)
RBC # BLD AUTO: 3.3 M/MM3 (ref 3.65–5.03)

## 2022-08-21 PROCEDURE — 3E0R3BZ INTRODUCTION OF ANESTHETIC AGENT INTO SPINAL CANAL, PERCUTANEOUS APPROACH: ICD-10-PCS | Performed by: ADVANCED PRACTICE MIDWIFE

## 2022-08-21 PROCEDURE — 00HU33Z INSERTION OF INFUSION DEVICE INTO SPINAL CANAL, PERCUTANEOUS APPROACH: ICD-10-PCS | Performed by: ADVANCED PRACTICE MIDWIFE

## 2022-08-21 RX ADMIN — IBUPROFEN SCH MG: 800 TABLET, FILM COATED ORAL at 16:03

## 2022-08-21 RX ADMIN — IBUPROFEN SCH MG: 800 TABLET, FILM COATED ORAL at 21:30

## 2022-08-21 NOTE — ANESTHESIA DAY OF SURGERY
Anesthesia Day of Surgery





- Day of Surgery


Patient Examined: Yes


Patient H&P Reviewed: Yes


Patient is NPO: Yes


Beta Blockers: No


Cardiac Clearance: No


Pulmonary Clearance: No


David's Test: N/A

## 2022-08-21 NOTE — ANESTHESIA CONSULTATION
Anesthesia Consult and Med Hx


Date of service: 08/21/22





- Airway


Anesthetic Teeth Evaluation: Good


ROM Head & Neck: Adequate


Mental/Hyoid Distance: Adequate


Mallampati Class: Class II


Intubation Access Assessment: Probably Good





- Pulmonary Exam


CTA: Yes





- Cardiac Exam


Cardiac Exam: RRR





- Pre-Operative Health Status


ASA Pre-Surgery Classification: ASA2


Proposed Anesthetic Plan: Epidural





- Pulmonary


Hx Smoking: Yes (smoking Hookah)


Hx Asthma: No


Hx Respiratory Symptoms: No


SOB: No


COPD: No


Home Oxygen Therapy: No


Hx Pneumonia: No


Hx Sleep Apnea: No





- Cardiovascular System


Hx Hypertension: No


Hx Coronary Artery Disease: No


Hx Heart Attack/AMI: No


Hx Angina: No


Hx Percutaneous Transluminal Coronary Angioplasty (PTCA): No


Hx Cardia Arrhythmia: Yes (Hx SVT-had ablation. Reports palpitations)


Hx Pacemaker: No


Hx Internal Defibrillator: No


Hx Valvular Heart Disease: No


Hx Heart Murmur: Yes


Hx Peripheral Vascular Disease: No





- Central Nervous System


Hx Neuromuscular Disorder: No


Hx Seizures: No


CVA: Yes (? Was seen in our ER for CVA)


Hx Back Pain: No


Hx Psychiatric Problems: No





- Gastrointestinal


Hx Ulcer: No


Hx Gastroesophageal Reflux Disease: No





- Endocrine


Hx Renal Disease: No


Hx End Stage Renal Disease: No


Hx Cirrhosis: No


Hx Liver Disease: No


Hx Insulin Dependent Diabetes: No


Hx Non-Insulin Dependent Diabetes: No


Hx Hypothyroidism: No


Hx Hyperthyroidism: No





- Hematic


Hx Anemia: Yes


Hx Sickle Cell Disease: No





- Other Systems


Hx Alcohol Use: No


Hx Substance Use: Yes (MJ)


Hx Cancer: No


Hx Obesity: No

## 2022-08-21 NOTE — EVENT NOTE
Date: 22 (825)





22yo  s/p @ 0614 passing clots. On exam boggy lower uterine segment. 

300ml of clots of blood removed from lower uterus. While examining the clots a 

piece of placenta is noted. During this process, Methergine IM, IVPB pitocin 

infusing, cytotec 800mcg [place], and Fentanyl 100mcg IVP was administered. Patient 

tolerated procedure well, Currently stable. Total of 1100ml EBL ( includes 

delivery).

## 2022-08-21 NOTE — PROGRESS NOTE
Labor Epidural





- Labor Epidural


Start Time: 02:27


Stop Time: 02:33


Performed by:: PAYAL RASHID


Procedure: 





Epidural Requested for Labor Pain. H&P and PT Chart reviewed and consent 

obtained. Time out performed and the procedure was explained, all questions 

answered.  Patient was placed in a sitting position with monitors applied. The 

PTs back was prepped and draped in usual sterile fashion. The Skin was 

localized with 3 mL of 1% lidocaine at L3-L4.  A 17-gauge Touhy epidural needle 

was advanced to AVE with saline at 7 cm and no blood/CSF was noted via epidural 

needle.  Epidural catheter was advanced to 12 cm. There was negative aspiration 

for blood and CSF in the catheter and negative response to a test dose of 3 ml 

1.5% lidocaine w/ Epi and a sterile dressing was applied Patient tolerated the 

procedure well and there were no immediate complications noted.

## 2022-08-21 NOTE — EVENT NOTE
Date: 08/21/22 (5411)





Re-assess patient, FFMBU, scant blood noted on pad. Pt ambulating to restroom.

## 2022-08-21 NOTE — PROCEDURE NOTE
OB Delivery Note





- Delivery


Date of Delivery: 22


Surgeon: BROOKS CHAMORRO


Estimated blood loss: 500cc





- Vaginal


Delivery presentation: vertex


Delivery position: OA


Intrapartum events: none


Delivery induction: none


Delivery augmentation: rupture of membranes


Delivery monitor: external FHT, external uterine


Route of delivery: 


Delivery placenta: spontaneous


Episiotomy: none


Delivery laceration: none


Anesthesia: epidural


Delivery comments: 





 of a viable female 7# 14 oz on 22 @ 0614 over intact perineum. 

Placenta delivered 3 VCI. Apgar 9/9. QBL 500cc. 





- Infant


  ** A


Apgar at 1 minute: 9


Apgar at 5 minutes: 9


Infant Gender: Female (7# 14 oz)

## 2022-08-22 VITALS — SYSTOLIC BLOOD PRESSURE: 118 MMHG | DIASTOLIC BLOOD PRESSURE: 76 MMHG

## 2022-08-22 RX ADMIN — IBUPROFEN SCH MG: 800 TABLET, FILM COATED ORAL at 05:38

## 2022-08-22 RX ADMIN — IBUPROFEN SCH MG: 800 TABLET, FILM COATED ORAL at 11:39

## 2022-08-22 NOTE — DISCHARGE SUMMARY
Providers





- Providers


Date of Admission: 


22 18:09





Date of discharge: 22


Attending physician: 


RITA MERCEDES





                                        





22 12:09


Consult to Case Management [CONS] Routine 


   Services Needed at Discharge: 


   Notified:: Jerry


   Phone number called:: [phone]


   Was contact made?: Yes


   If yes, spoke with:: Jerry


   Time called:: 12:11


   Comment:: Mom needs a Car seat











Primary care physician: 


RITA MERCEDES








Hospitalization


Reason for admission: IUP at term


Delivery: 


Episiotomy: none


Laceration: none


Other postpartum procedures: none


Postpartum complications: none


Discharge diagnosis: IUP at term delivered


[place] baby: female


Hospital course: 


Term pregnancy with uncomplicated .  Postpartum uneventful.


Condition at discharge: Good


Disposition: 01 HOME / SELF CARE / HOMELESS





Plan





- Provider Discharge Summary


Additional instructions: 


[]  Smoking cessation referral if applicable(refer to patient education folder 

for contact #)


[]  Refer to [facility]'s [facility] Booklet








Call your doctor immediately for:


* Fever > 100.5


* Heavy vaginal bleeding ( >1 pad per hour)


* Severe persistent headache


* Shortness of breath


* Reddened, hot, painful area to leg or breast


* Drainage or odor from incision.





* Keep incision clean and dry at all times and follow doctor's instructions 

regarding bathing/showering











- Follow up plan


Follow up: 


RITA MERCEDES MD [Primary Care Provider] - 6 Weeks

## 2022-08-22 NOTE — PROGRESS NOTE
Assessment and Plan


PPD#1  doing well


1. routine postpartum care and discharge home later today





Subjective


Date of service: 22


Principal diagnosis: PPD#1 


Interval history: 


pt has no complaints and wants to go home today.  pt vag bleed less than a 

period, denies pelvic pain, voiding without difficulty and is bottle and breast 

feeding.





Objective





- Constitutional


Vitals: 


                               Vital Signs - 12hr











  22





  05:38 08:24 08:38


 


Temperature  98.1 F 


 


Pulse Rate  78 


 


Respiratory 18 18 





Rate   


 


Blood Pressure  121/80 





[Left]   


 


O2 Sat by Pulse  100 





Oximetry   


 


O2 Sat by Pulse   99





Oximetry [   





Bilateral]   











General appearance: Present: no acute distress





- Neck


Neck: normal ROM





- Respiratory


Respiratory effort: normal





- Breasts


Breasts: deferred





- Cardiovascular


Rhythm: regular


Extremities: No edema





- Gastrointestinal


General gastrointestinal: Present: soft, non-tender





- Genitourinary


Female genitourinary: other (fundus non-tender 1cm below umbilicus)





- Integumentary


Integumentary: warm, dry





- Neurologic


Neurologic: moves all extremities





- Psychiatric


Psychiatric: cooperative





- Labs


CBC & Chem 7: 


                                 22 19:58





Labs: 


                              Abnormal lab results











  22 Range/Units





  19:58 


 


RBC  3.30 L  (3.65-5.03)  M/mm3


 


MCH  33 H  (28-32)  pg


 


MCHC  35 H  (30-34)  %


 


Mono % (Auto)  7.7 H  (0.0-7.3)  %


 


Seg Neutrophils %  73.5 H  (40.0-70.0)  %














Medications & Allergies





- Medications


Allergies/Adverse Reactions: 


                                    Allergies





No Known Allergies Allergy (Verified 22 11:26)


   








Home Medications: 


                                Home Medications











 Medication  Instructions  Recorded  Confirmed  Last Taken  Type


 


Prenatal Vit No.179/Iron/Folic 1 each PO DAILY 22 09:00 

History





[Prenatal Tablet]     











Active Medications: 














Generic Name Dose Route Start Last Admin





  Trade Name Freq  PRN Reason Stop Dose Admin


 


Acetaminophen  650 mg  22 06:37 





  Acetaminophen 325 Mg Tab  PO  





  Q4H PRN  





  Pain MILD(1-3)/Fever >100.5/HA  


 


Bisacodyl  10 mg  22 06:37 





  Bisacodyl 10 Mg Rect Supp  NE  





  BID PRN  





  Constipation  


 


Diphenhydramine HCl  25 mg  22 06:37 





  Diphenhydramine 25 Mg Cap  PO  





  Q6H PRN  





  Itching  


 


Ibuprofen  800 mg  22 07:00  22 11:39





  Ibuprofen 800 Mg Tab  PO   800 mg





  Q6H ERIC   Administration


 


Magnesium Hydroxide  30 ml  22 06:37 





  Magnesium Hydroxide (Mom) Oral Liqd Udc  PO  





  HS PRN  





  Constipation  


 


Multi-Ingredient Ointment  1 applic  22 06:37 





  Lanolin/Zinc/Dimethicone (Lansinoh) 7 Gm  TP  





  PRN PRN  





  Sore Nipples  


 


Oxycodone/Acetaminophen  1 tab  22 06:37 





  Oxycodone /Acetaminophen 5-325mg Tab  PO  





  Q6H PRN  





  Pain, Moderate (4-6)  


 


Witch Hazel/Glycerin  1 each  22 06:37 





  Witch Hazel/ Glycerin Pad  TP  





  PRN PRN  





  Hemorrhoid/cleansing/soothing